# Patient Record
Sex: FEMALE | Race: WHITE | Employment: FULL TIME | URBAN - METROPOLITAN AREA
[De-identification: names, ages, dates, MRNs, and addresses within clinical notes are randomized per-mention and may not be internally consistent; named-entity substitution may affect disease eponyms.]

---

## 2018-09-18 ENCOUNTER — APPOINTMENT (OUTPATIENT)
Dept: GENERAL RADIOLOGY | Age: 53
End: 2018-09-18
Payer: COMMERCIAL

## 2018-09-18 ENCOUNTER — HOSPITAL ENCOUNTER (EMERGENCY)
Age: 53
Discharge: HOME | End: 2018-09-18
Payer: COMMERCIAL

## 2018-09-18 VITALS
OXYGEN SATURATION: 98 % | BODY MASS INDEX: 40.48 KG/M2 | SYSTOLIC BLOOD PRESSURE: 122 MMHG | TEMPERATURE: 98.5 F | RESPIRATION RATE: 16 BRPM | WEIGHT: 220 LBS | HEART RATE: 69 BPM | HEIGHT: 62 IN | DIASTOLIC BLOOD PRESSURE: 72 MMHG

## 2018-09-18 DIAGNOSIS — S53.401A SPRAIN OF RIGHT ELBOW, INITIAL ENCOUNTER: Primary | ICD-10-CM

## 2018-09-18 DIAGNOSIS — S43.401A SPRAIN OF RIGHT SHOULDER, UNSPECIFIED SHOULDER SPRAIN TYPE, INITIAL ENCOUNTER: ICD-10-CM

## 2018-09-18 PROCEDURE — 73030 X-RAY EXAM OF SHOULDER: CPT

## 2018-09-18 PROCEDURE — 6370000000 HC RX 637 (ALT 250 FOR IP): Performed by: NURSE PRACTITIONER

## 2018-09-18 PROCEDURE — 99283 EMERGENCY DEPT VISIT LOW MDM: CPT

## 2018-09-18 PROCEDURE — 73080 X-RAY EXAM OF ELBOW: CPT

## 2018-09-18 RX ORDER — IBUPROFEN 800 MG/1
800 TABLET ORAL ONCE
Status: COMPLETED | OUTPATIENT
Start: 2018-09-18 | End: 2018-09-18

## 2018-09-18 RX ORDER — ESTRADIOL 0.5 MG/1
0.5 TABLET ORAL DAILY
COMMUNITY
End: 2020-03-09

## 2018-09-18 RX ADMIN — IBUPROFEN 800 MG: 800 TABLET ORAL at 16:12

## 2018-09-18 ASSESSMENT — PAIN DESCRIPTION - PAIN TYPE
TYPE: ACUTE PAIN
TYPE: ACUTE PAIN

## 2018-09-18 ASSESSMENT — PAIN DESCRIPTION - FREQUENCY: FREQUENCY: CONTINUOUS

## 2018-09-18 ASSESSMENT — PAIN SCALES - GENERAL
PAINLEVEL_OUTOF10: 4
PAINLEVEL_OUTOF10: 5
PAINLEVEL_OUTOF10: 6
PAINLEVEL_OUTOF10: 6

## 2018-09-18 ASSESSMENT — PAIN DESCRIPTION - DESCRIPTORS
DESCRIPTORS: ACHING
DESCRIPTORS: ACHING

## 2018-09-18 ASSESSMENT — PAIN DESCRIPTION - ORIENTATION
ORIENTATION: RIGHT
ORIENTATION: RIGHT

## 2018-09-18 ASSESSMENT — PAIN DESCRIPTION - LOCATION
LOCATION: ELBOW
LOCATION: ELBOW

## 2018-09-18 NOTE — ED PROVIDER NOTES
[facility]       Department of Emergency Medicine   ED  Provider Note  Admit Date/RoomTime: 2018  3:37 PM  ED Room:   MRN: [id number]  Chief Complaint: Elbow Pain (right elbow pain hit it  on the wall)       History of Present Illness   Source of history provided by:  patient. History/Exam Limitations: none. Deepak Sheffield is a 48 y.o. female who has a past medical history of: History reviewed. No pertinent past medical history. presents to the ED by private car and is alone for right shoulder and elbow pain, beginning a few days ago and are gradually worsening since that time. The complaint has been persistent, moderate in severity. Patient states that she was carrying a wet-dry vac and the lid was not latched properly and when it almost spilled she jerked her shoulder and arm up hitting her right elbow on the wall. She states the symptoms are gradually worsening since the incident. She states the pain is achy in nature. No numbness or tingling. Minimal relief from 400 mg of ibuprofen at home. ROS    Pertinent positives and negatives are stated within HPI, all other systems reviewed and are negative. Past Surgical History:   Procedure Laterality Date     SECTION      HYSTERECTOMY     Social History:  reports that she has never smoked. She has never used smokeless tobacco. She reports that she does not drink alcohol or use drugs. Family History: family history is not on file. Allergies: Penicillins    Physical Exam   Oxygen Saturation Interpretation: Normal.   ED Triage Vitals   BP Temp Temp Source Pulse Resp SpO2 Height Weight   18 1544 18 1544 18 1544 18 1544 18 1544 18 1544 18 1541 18 1541   (!) 152/92 98.5 °F (36.9 °C) Oral 69 16 98 % 5' 2\" (1.575 m) 220 lb (99.8 kg)       Physical Exam  · Constitutional/General: Alert and oriented x3, well appearing, non toxic  · HEENT:  NC/NT. PERRLA,  Airway patent.   · Neck: Supple, full ROM, non tender to palpation in the midline, no stridor, no crepitus, no meningeal signs  · Respiratory: Lungs clear to auscultation bilaterally, no wheezes, rales, or rhonchi. Not in respiratory distress  · CV:  Regular rate. Regular rhythm. No murmurs, gallops, or rubs. 2+ distal pulses  · Chest: No chest wall tenderness  · GI:  Abdomen Soft, Non tender, Non distended. +BS. No rebound, guarding, or rigidity. No pulsatile masses. · Musculoskeletal: Moves all extremities x 4. Warm and well perfused, no clubbing, cyanosis, or edema. Capillary refill <3 seconds. Full range of motion with pain to right shoulder and elbow. No crepitus. No deformities. Tenderness to the lateral aspect of the antecubital and the right trapezius. MSPs intact. · Integument: skin warm and dry. No rashes. · Lymphatic: no lymphadenopathy noted  · Neurologic: GCS 15, no focal deficits, symmetric strength 5/5 in the upper and lower extremities bilaterally  · Psychiatric: Normal Affect    Lab / Imaging Results   (All laboratory and radiology results have been personally reviewed by myself)  Labs:  No results found for this visit on 09/18/18. Imaging: All Radiology results interpreted by Radiologist unless otherwise noted. XR ELBOW RIGHT (MIN 3 VIEWS)   Final Result   No acute skeletal traumatic findings. XR SHOULDER RIGHT (MIN 2 VIEWS)   Final Result   No evidence of fracture or dislocation of the shoulder. ED Course / Medical Decision Making     Medications   ibuprofen (ADVIL;MOTRIN) tablet 800 mg (800 mg Oral Given 9/18/18 1612)     ED Course as of Sep 18 2054   Tue Sep 18, 2018   1728 Awake and alert. Discussed results and plan. Symptoms improved. Exam unchanged. Answered questions.    [JG]      ED Course User Index  [JG] YAIMA Bolaños - CNP     Consult(s):   None    Procedure(s):   none    MDM:   Patient presents with a history of injury to right elbow and shoulder a few days ago with full range of

## 2020-11-28 ENCOUNTER — HOSPITAL ENCOUNTER (EMERGENCY)
Age: 55
Discharge: HOME | End: 2020-11-29
Attending: EMERGENCY MEDICINE
Payer: COMMERCIAL

## 2020-11-28 ENCOUNTER — APPOINTMENT (OUTPATIENT)
Dept: GENERAL RADIOLOGY | Age: 55
End: 2020-11-28
Payer: COMMERCIAL

## 2020-11-28 VITALS
BODY MASS INDEX: 39.51 KG/M2 | OXYGEN SATURATION: 97 % | RESPIRATION RATE: 16 BRPM | HEIGHT: 63 IN | HEART RATE: 83 BPM | TEMPERATURE: 96.6 F | DIASTOLIC BLOOD PRESSURE: 79 MMHG | SYSTOLIC BLOOD PRESSURE: 166 MMHG | WEIGHT: 223 LBS

## 2020-11-28 PROCEDURE — 96365 THER/PROPH/DIAG IV INF INIT: CPT

## 2020-11-28 PROCEDURE — 99283 EMERGENCY DEPT VISIT LOW MDM: CPT

## 2020-11-28 PROCEDURE — 90715 TDAP VACCINE 7 YRS/> IM: CPT | Performed by: EMERGENCY MEDICINE

## 2020-11-28 PROCEDURE — 73140 X-RAY EXAM OF FINGER(S): CPT

## 2020-11-28 PROCEDURE — 6360000002 HC RX W HCPCS: Performed by: EMERGENCY MEDICINE

## 2020-11-28 PROCEDURE — 90471 IMMUNIZATION ADMIN: CPT | Performed by: EMERGENCY MEDICINE

## 2020-11-28 RX ORDER — CLINDAMYCIN PHOSPHATE 600 MG/50ML
600 INJECTION INTRAVENOUS ONCE
Status: COMPLETED | OUTPATIENT
Start: 2020-11-28 | End: 2020-11-29

## 2020-11-28 RX ADMIN — TETANUS TOXOID, REDUCED DIPHTHERIA TOXOID AND ACELLULAR PERTUSSIS VACCINE, ADSORBED 0.5 ML: 5; 2.5; 8; 8; 2.5 SUSPENSION INTRAMUSCULAR at 23:50

## 2020-11-28 ASSESSMENT — PAIN SCALES - GENERAL: PAINLEVEL_OUTOF10: 5

## 2020-11-28 ASSESSMENT — PAIN DESCRIPTION - ORIENTATION: ORIENTATION: RIGHT

## 2020-11-28 ASSESSMENT — PAIN DESCRIPTION - LOCATION: LOCATION: FINGER (COMMENT WHICH ONE)

## 2020-11-28 ASSESSMENT — PAIN DESCRIPTION - PAIN TYPE: TYPE: ACUTE PAIN

## 2020-11-28 ASSESSMENT — PAIN DESCRIPTION - DESCRIPTORS: DESCRIPTORS: SQUEEZING;DULL

## 2020-11-29 PROCEDURE — 2500000003 HC RX 250 WO HCPCS: Performed by: EMERGENCY MEDICINE

## 2020-11-29 RX ORDER — CLINDAMYCIN HYDROCHLORIDE 150 MG/1
450 CAPSULE ORAL 3 TIMES DAILY
Qty: 90 CAPSULE | Refills: 0 | Status: SHIPPED | OUTPATIENT
Start: 2020-11-29 | End: 2020-12-09

## 2020-11-29 RX ADMIN — CLINDAMYCIN PHOSPHATE 600 MG: 600 INJECTION, SOLUTION INTRAVENOUS at 00:00

## 2020-11-29 NOTE — ED NOTES
Right thumb red and tender-  Tetanus status \"A long time ago\" + radial pulse   Cap refill < 2 seconds     Victoria Baker RN  11/28/20 5103

## 2020-11-29 NOTE — ED PROVIDER NOTES
HPI:  20, Time: 11:31 PM SOPHY Escoto is a 50 y.o. female presenting to the ED for puncture wound to right thumb, beginning 2 days ago after sticking her hand in puppy's mouth to remove a squirrel. Unsure if puncutred by dog tooth or squirrel claw. Now has a red ring around the puncture wound. Concerned for retained foreign body as well. Needs tetanus update too. She is right-handed. The complaint has been persistent, moderate in severity, and worsened by nothing. Patient denies paresthesias or difficulty or pain with movement of the right thumb or hand or other complaints bennie this time. She is allergic to PCN. ROS:   A complete review of systems was performed and all pertinent positives and negatives are stated within HPI, all other systems reviewed and are negative.      --------------------------------------------- PAST HISTORY ---------------------------------------------  Past Medical History:  has no past medical history on file. Past Surgical History:  has a past surgical history that includes Hysterectomy and  section. Social History:  reports that she has never smoked. She has never used smokeless tobacco. She reports that she does not drink alcohol or use drugs. Family History: family history is not on file. The patients home medications have been reviewed. Allergies: Penicillins        ----------------------------------------PHYSICAL EXAM--------------------------------------  Constitutional:  Well developed, well nourished, no acute distress, non-toxic appearance   Eyes:  PERRL, conjunctiva normal, EOMI  HENT:  Atraumatic, external ears normal, nose normal, oropharynx moist. Neck- normal range of motion, no nuchal rigidity   Respiratory:  No respiratory distress  Cardiovascular:  Normal rate, normal rhythm. Radial pulses 2+ bilaterally. Musculoskeletal:  No edema, no deformities.  Patient has full range of motion of right hand fingers and thumb disposition, IV medications and a personal history and physicial eaxmination    This patient has remained hemodynamically stable and remained unchanged during their ED course. Re-Evaluations:  Time: 12:17 AM EST   Re-evaluation. Patients symptoms show no change  Repeat physical examination is not changed      Consultations:   none    Critical Care: none    Genny STOKES DO, am the Primary Provider of Record    Counseling: The emergency provider has spoken with the patient and discussed todays results, in addition to providing specific details for the plan of care and counseling regarding the diagnosis and prognosis. Questions are answered at this time and they are agreeable with the plan.    --------------------------- IMPRESSION AND DISPOSITION ---------------------------------    IMPRESSION  1. Bite by animal    2.  Elevated blood pressure reading        DISPOSITION  Disposition: Discharge to home  Patient condition is stable             Nu Yousif DO  11/29/20 0017

## 2022-05-28 ENCOUNTER — APPOINTMENT (OUTPATIENT)
Dept: CT IMAGING | Age: 57
End: 2022-05-28
Payer: COMMERCIAL

## 2022-05-28 ENCOUNTER — HOSPITAL ENCOUNTER (INPATIENT)
Age: 57
LOS: 1 days | Discharge: HOME | DRG: 445 | End: 2022-05-29
Attending: STUDENT IN AN ORGANIZED HEALTH CARE EDUCATION/TRAINING PROGRAM | Admitting: STUDENT IN AN ORGANIZED HEALTH CARE EDUCATION/TRAINING PROGRAM
Payer: COMMERCIAL

## 2022-05-28 ENCOUNTER — APPOINTMENT (OUTPATIENT)
Dept: ULTRASOUND IMAGING | Age: 57
DRG: 445 | End: 2022-05-28
Attending: STUDENT IN AN ORGANIZED HEALTH CARE EDUCATION/TRAINING PROGRAM
Payer: COMMERCIAL

## 2022-05-28 ENCOUNTER — HOSPITAL ENCOUNTER (EMERGENCY)
Age: 57
Discharge: ANOTHER HOSPITAL | End: 2022-05-28
Attending: EMERGENCY MEDICINE
Payer: COMMERCIAL

## 2022-05-28 VITALS
DIASTOLIC BLOOD PRESSURE: 88 MMHG | OXYGEN SATURATION: 98 % | HEART RATE: 92 BPM | HEIGHT: 63 IN | SYSTOLIC BLOOD PRESSURE: 170 MMHG | RESPIRATION RATE: 14 BRPM | BODY MASS INDEX: 39.87 KG/M2 | TEMPERATURE: 98.6 F | WEIGHT: 225 LBS

## 2022-05-28 DIAGNOSIS — R11.2 INTRACTABLE NAUSEA AND VOMITING: Primary | ICD-10-CM

## 2022-05-28 DIAGNOSIS — R10.11 ABDOMINAL PAIN, RIGHT UPPER QUADRANT: ICD-10-CM

## 2022-05-28 PROBLEM — R52 INTRACTABLE PAIN: Status: ACTIVE | Noted: 2022-05-28

## 2022-05-28 LAB
ALBUMIN SERPL-MCNC: 4.3 G/DL (ref 3.5–5.2)
ALBUMIN SERPL-MCNC: 4.5 G/DL (ref 3.5–5.2)
ALP BLD-CCNC: 100 U/L (ref 35–104)
ALP BLD-CCNC: 93 U/L (ref 35–104)
ALT SERPL-CCNC: 23 U/L (ref 0–32)
ALT SERPL-CCNC: 26 U/L (ref 0–32)
ANION GAP SERPL CALCULATED.3IONS-SCNC: 12 MMOL/L (ref 7–16)
ANION GAP SERPL CALCULATED.3IONS-SCNC: 14 MMOL/L (ref 7–16)
AST SERPL-CCNC: 18 U/L (ref 0–31)
AST SERPL-CCNC: 20 U/L (ref 0–31)
BACTERIA: ABNORMAL /HPF
BASOPHILS ABSOLUTE: 0.03 E9/L (ref 0–0.2)
BASOPHILS ABSOLUTE: 0.04 E9/L (ref 0–0.2)
BASOPHILS RELATIVE PERCENT: 0.4 % (ref 0–2)
BASOPHILS RELATIVE PERCENT: 0.5 % (ref 0–2)
BILIRUB SERPL-MCNC: 0.3 MG/DL (ref 0–1.2)
BILIRUB SERPL-MCNC: 0.5 MG/DL (ref 0–1.2)
BILIRUBIN URINE: NEGATIVE
BLOOD, URINE: ABNORMAL
BUN BLDV-MCNC: 12 MG/DL (ref 6–20)
BUN BLDV-MCNC: 19 MG/DL (ref 6–20)
C-REACTIVE PROTEIN: 0.8 MG/DL (ref 0–0.4)
CALCIUM SERPL-MCNC: 9.1 MG/DL (ref 8.6–10.2)
CALCIUM SERPL-MCNC: 9.8 MG/DL (ref 8.6–10.2)
CHLORIDE BLD-SCNC: 105 MMOL/L (ref 98–107)
CHLORIDE BLD-SCNC: 105 MMOL/L (ref 98–107)
CHOLESTEROL, TOTAL: 173 MG/DL (ref 0–199)
CLARITY: CLEAR
CO2: 22 MMOL/L (ref 22–29)
CO2: 25 MMOL/L (ref 22–29)
COLOR: YELLOW
CREAT SERPL-MCNC: 0.6 MG/DL (ref 0.5–1)
CREAT SERPL-MCNC: 0.8 MG/DL (ref 0.5–1)
EOSINOPHILS ABSOLUTE: 0.06 E9/L (ref 0.05–0.5)
EOSINOPHILS ABSOLUTE: 0.34 E9/L (ref 0.05–0.5)
EOSINOPHILS RELATIVE PERCENT: 0.7 % (ref 0–6)
EOSINOPHILS RELATIVE PERCENT: 4.3 % (ref 0–6)
GFR AFRICAN AMERICAN: >60
GFR AFRICAN AMERICAN: >60
GFR NON-AFRICAN AMERICAN: >60 ML/MIN/1.73
GFR NON-AFRICAN AMERICAN: >60 ML/MIN/1.73
GLUCOSE BLD-MCNC: 131 MG/DL (ref 74–99)
GLUCOSE BLD-MCNC: 92 MG/DL (ref 74–99)
GLUCOSE URINE: NEGATIVE MG/DL
HBA1C MFR BLD: 5.8 % (ref 4–5.6)
HCT VFR BLD CALC: 40.7 % (ref 34–48)
HCT VFR BLD CALC: 41.2 % (ref 34–48)
HDLC SERPL-MCNC: 42 MG/DL
HEMOGLOBIN: 12.8 G/DL (ref 11.5–15.5)
HEMOGLOBIN: 13.6 G/DL (ref 11.5–15.5)
IMMATURE GRANULOCYTES #: 0.02 E9/L
IMMATURE GRANULOCYTES #: 0.03 E9/L
IMMATURE GRANULOCYTES %: 0.3 % (ref 0–5)
IMMATURE GRANULOCYTES %: 0.4 % (ref 0–5)
KETONES, URINE: NEGATIVE MG/DL
LACTIC ACID: 1.4 MMOL/L (ref 0.5–2.2)
LDL CHOLESTEROL CALCULATED: 96 MG/DL (ref 0–99)
LEUKOCYTE ESTERASE, URINE: ABNORMAL
LIPASE: 29 U/L (ref 13–60)
LYMPHOCYTES ABSOLUTE: 1.34 E9/L (ref 1.5–4)
LYMPHOCYTES ABSOLUTE: 2.61 E9/L (ref 1.5–4)
LYMPHOCYTES RELATIVE PERCENT: 15.7 % (ref 20–42)
LYMPHOCYTES RELATIVE PERCENT: 32.8 % (ref 20–42)
MCH RBC QN AUTO: 26.9 PG (ref 26–35)
MCH RBC QN AUTO: 27.5 PG (ref 26–35)
MCHC RBC AUTO-ENTMCNC: 31.4 % (ref 32–34.5)
MCHC RBC AUTO-ENTMCNC: 33 % (ref 32–34.5)
MCV RBC AUTO: 83.2 FL (ref 80–99.9)
MCV RBC AUTO: 85.7 FL (ref 80–99.9)
MONOCYTES ABSOLUTE: 0.55 E9/L (ref 0.1–0.95)
MONOCYTES ABSOLUTE: 0.57 E9/L (ref 0.1–0.95)
MONOCYTES RELATIVE PERCENT: 6.5 % (ref 2–12)
MONOCYTES RELATIVE PERCENT: 7.2 % (ref 2–12)
NEUTROPHILS ABSOLUTE: 4.37 E9/L (ref 1.8–7.3)
NEUTROPHILS ABSOLUTE: 6.51 E9/L (ref 1.8–7.3)
NEUTROPHILS RELATIVE PERCENT: 54.9 % (ref 43–80)
NEUTROPHILS RELATIVE PERCENT: 76.3 % (ref 43–80)
NITRITE, URINE: NEGATIVE
PDW BLD-RTO: 14.5 FL (ref 11.5–15)
PDW BLD-RTO: 14.5 FL (ref 11.5–15)
PH UA: 6 (ref 5–9)
PLATELET # BLD: 312 E9/L (ref 130–450)
PLATELET # BLD: 356 E9/L (ref 130–450)
PMV BLD AUTO: 10.6 FL (ref 7–12)
PMV BLD AUTO: 10.8 FL (ref 7–12)
POTASSIUM REFLEX MAGNESIUM: 4 MMOL/L (ref 3.5–5)
POTASSIUM SERPL-SCNC: 3.8 MMOL/L (ref 3.5–5)
PROTEIN UA: NEGATIVE MG/DL
RBC # BLD: 4.75 E12/L (ref 3.5–5.5)
RBC # BLD: 4.95 E12/L (ref 3.5–5.5)
RBC UA: ABNORMAL /HPF (ref 0–2)
SEDIMENTATION RATE, ERYTHROCYTE: 7 MM/HR (ref 0–20)
SODIUM BLD-SCNC: 141 MMOL/L (ref 132–146)
SODIUM BLD-SCNC: 142 MMOL/L (ref 132–146)
SPECIFIC GRAVITY UA: 1.02 (ref 1–1.03)
TOTAL PROTEIN: 7.4 G/DL (ref 6.4–8.3)
TOTAL PROTEIN: 7.6 G/DL (ref 6.4–8.3)
TRIGL SERPL-MCNC: 175 MG/DL (ref 0–149)
TROPONIN, HIGH SENSITIVITY: <6 NG/L (ref 0–9)
UROBILINOGEN, URINE: 0.2 E.U./DL
VLDLC SERPL CALC-MCNC: 35 MG/DL
WBC # BLD: 8 E9/L (ref 4.5–11.5)
WBC # BLD: 8.5 E9/L (ref 4.5–11.5)
WBC UA: ABNORMAL /HPF (ref 0–5)

## 2022-05-28 PROCEDURE — 85025 COMPLETE CBC W/AUTO DIFF WBC: CPT

## 2022-05-28 PROCEDURE — 6370000000 HC RX 637 (ALT 250 FOR IP): Performed by: STUDENT IN AN ORGANIZED HEALTH CARE EDUCATION/TRAINING PROGRAM

## 2022-05-28 PROCEDURE — G0378 HOSPITAL OBSERVATION PER HR: HCPCS

## 2022-05-28 PROCEDURE — 6360000002 HC RX W HCPCS: Performed by: STUDENT IN AN ORGANIZED HEALTH CARE EDUCATION/TRAINING PROGRAM

## 2022-05-28 PROCEDURE — 6360000004 HC RX CONTRAST MEDICATION: Performed by: RADIOLOGY

## 2022-05-28 PROCEDURE — 83605 ASSAY OF LACTIC ACID: CPT

## 2022-05-28 PROCEDURE — 1200000000 HC SEMI PRIVATE

## 2022-05-28 PROCEDURE — 81001 URINALYSIS AUTO W/SCOPE: CPT

## 2022-05-28 PROCEDURE — 93005 ELECTROCARDIOGRAM TRACING: CPT

## 2022-05-28 PROCEDURE — 74177 CT ABD & PELVIS W/CONTRAST: CPT

## 2022-05-28 PROCEDURE — 96372 THER/PROPH/DIAG INJ SC/IM: CPT

## 2022-05-28 PROCEDURE — 80053 COMPREHEN METABOLIC PANEL: CPT

## 2022-05-28 PROCEDURE — 6360000002 HC RX W HCPCS

## 2022-05-28 PROCEDURE — 85651 RBC SED RATE NONAUTOMATED: CPT

## 2022-05-28 PROCEDURE — 83036 HEMOGLOBIN GLYCOSYLATED A1C: CPT

## 2022-05-28 PROCEDURE — 84484 ASSAY OF TROPONIN QUANT: CPT

## 2022-05-28 PROCEDURE — 36415 COLL VENOUS BLD VENIPUNCTURE: CPT

## 2022-05-28 PROCEDURE — 96376 TX/PRO/DX INJ SAME DRUG ADON: CPT

## 2022-05-28 PROCEDURE — 83690 ASSAY OF LIPASE: CPT

## 2022-05-28 PROCEDURE — 6360000002 HC RX W HCPCS: Performed by: EMERGENCY MEDICINE

## 2022-05-28 PROCEDURE — 2580000003 HC RX 258: Performed by: STUDENT IN AN ORGANIZED HEALTH CARE EDUCATION/TRAINING PROGRAM

## 2022-05-28 PROCEDURE — 99285 EMERGENCY DEPT VISIT HI MDM: CPT

## 2022-05-28 PROCEDURE — 96375 TX/PRO/DX INJ NEW DRUG ADDON: CPT

## 2022-05-28 PROCEDURE — 76705 ECHO EXAM OF ABDOMEN: CPT

## 2022-05-28 PROCEDURE — 96374 THER/PROPH/DIAG INJ IV PUSH: CPT

## 2022-05-28 PROCEDURE — 86140 C-REACTIVE PROTEIN: CPT

## 2022-05-28 PROCEDURE — 2580000003 HC RX 258: Performed by: EMERGENCY MEDICINE

## 2022-05-28 PROCEDURE — 80061 LIPID PANEL: CPT

## 2022-05-28 PROCEDURE — G0379 DIRECT REFER HOSPITAL OBSERV: HCPCS

## 2022-05-28 RX ORDER — SODIUM CHLORIDE 0.9 % (FLUSH) 0.9 %
10 SYRINGE (ML) INJECTION PRN
Status: DISCONTINUED | OUTPATIENT
Start: 2022-05-28 | End: 2022-05-29 | Stop reason: HOSPADM

## 2022-05-28 RX ORDER — ACETAMINOPHEN 650 MG/1
650 SUPPOSITORY RECTAL EVERY 6 HOURS PRN
Status: DISCONTINUED | OUTPATIENT
Start: 2022-05-28 | End: 2022-05-29 | Stop reason: HOSPADM

## 2022-05-28 RX ORDER — METOCLOPRAMIDE HYDROCHLORIDE 5 MG/ML
5 INJECTION INTRAMUSCULAR; INTRAVENOUS ONCE
Status: COMPLETED | OUTPATIENT
Start: 2022-05-28 | End: 2022-05-28

## 2022-05-28 RX ORDER — LANOLIN ALCOHOL/MO/W.PET/CERES
3 CREAM (GRAM) TOPICAL NIGHTLY PRN
Status: DISCONTINUED | OUTPATIENT
Start: 2022-05-28 | End: 2022-05-29 | Stop reason: HOSPADM

## 2022-05-28 RX ORDER — LORAZEPAM 2 MG/ML
0.5 INJECTION INTRAMUSCULAR ONCE
Status: COMPLETED | OUTPATIENT
Start: 2022-05-28 | End: 2022-05-28

## 2022-05-28 RX ORDER — LORAZEPAM 2 MG/ML
1 INJECTION INTRAMUSCULAR ONCE
Status: COMPLETED | OUTPATIENT
Start: 2022-05-28 | End: 2022-05-28

## 2022-05-28 RX ORDER — DIPHENHYDRAMINE HYDROCHLORIDE 50 MG/ML
50 INJECTION INTRAMUSCULAR; INTRAVENOUS ONCE
Status: COMPLETED | OUTPATIENT
Start: 2022-05-28 | End: 2022-05-28

## 2022-05-28 RX ORDER — SODIUM CHLORIDE 9 MG/ML
INJECTION, SOLUTION INTRAVENOUS CONTINUOUS
Status: DISCONTINUED | OUTPATIENT
Start: 2022-05-28 | End: 2022-05-29 | Stop reason: HOSPADM

## 2022-05-28 RX ORDER — ONDANSETRON 2 MG/ML
4 INJECTION INTRAMUSCULAR; INTRAVENOUS ONCE
Status: COMPLETED | OUTPATIENT
Start: 2022-05-28 | End: 2022-05-28

## 2022-05-28 RX ORDER — PROMETHAZINE HYDROCHLORIDE 25 MG/ML
6.25 INJECTION, SOLUTION INTRAMUSCULAR; INTRAVENOUS EVERY 6 HOURS PRN
Status: DISCONTINUED | OUTPATIENT
Start: 2022-05-28 | End: 2022-05-29 | Stop reason: HOSPADM

## 2022-05-28 RX ORDER — PROCHLORPERAZINE EDISYLATE 5 MG/ML
INJECTION INTRAMUSCULAR; INTRAVENOUS
Status: COMPLETED
Start: 2022-05-28 | End: 2022-05-28

## 2022-05-28 RX ORDER — ONDANSETRON 2 MG/ML
4 INJECTION INTRAMUSCULAR; INTRAVENOUS EVERY 6 HOURS PRN
Status: DISCONTINUED | OUTPATIENT
Start: 2022-05-28 | End: 2022-05-29 | Stop reason: HOSPADM

## 2022-05-28 RX ORDER — MORPHINE SULFATE 2 MG/ML
2 INJECTION, SOLUTION INTRAMUSCULAR; INTRAVENOUS EVERY 4 HOURS PRN
Status: DISCONTINUED | OUTPATIENT
Start: 2022-05-28 | End: 2022-05-29 | Stop reason: HOSPADM

## 2022-05-28 RX ORDER — SODIUM CHLORIDE 0.9 % (FLUSH) 0.9 %
10 SYRINGE (ML) INJECTION EVERY 12 HOURS SCHEDULED
Status: DISCONTINUED | OUTPATIENT
Start: 2022-05-28 | End: 2022-05-29 | Stop reason: HOSPADM

## 2022-05-28 RX ORDER — PROCHLORPERAZINE EDISYLATE 5 MG/ML
5 INJECTION INTRAMUSCULAR; INTRAVENOUS ONCE
Status: COMPLETED | OUTPATIENT
Start: 2022-05-28 | End: 2022-05-28

## 2022-05-28 RX ORDER — POTASSIUM CHLORIDE 7.45 MG/ML
10 INJECTION INTRAVENOUS PRN
Status: DISCONTINUED | OUTPATIENT
Start: 2022-05-28 | End: 2022-05-29 | Stop reason: HOSPADM

## 2022-05-28 RX ORDER — KETOROLAC TROMETHAMINE 30 MG/ML
30 INJECTION, SOLUTION INTRAMUSCULAR; INTRAVENOUS ONCE
Status: COMPLETED | OUTPATIENT
Start: 2022-05-28 | End: 2022-05-28

## 2022-05-28 RX ORDER — SODIUM CHLORIDE 9 MG/ML
INJECTION, SOLUTION INTRAVENOUS PRN
Status: DISCONTINUED | OUTPATIENT
Start: 2022-05-28 | End: 2022-05-29 | Stop reason: HOSPADM

## 2022-05-28 RX ORDER — ACETAMINOPHEN 325 MG/1
650 TABLET ORAL EVERY 6 HOURS PRN
Status: DISCONTINUED | OUTPATIENT
Start: 2022-05-28 | End: 2022-05-29 | Stop reason: HOSPADM

## 2022-05-28 RX ORDER — POTASSIUM CHLORIDE 20 MEQ/1
40 TABLET, EXTENDED RELEASE ORAL PRN
Status: DISCONTINUED | OUTPATIENT
Start: 2022-05-28 | End: 2022-05-29 | Stop reason: HOSPADM

## 2022-05-28 RX ORDER — ONDANSETRON 4 MG/1
4 TABLET, ORALLY DISINTEGRATING ORAL EVERY 8 HOURS PRN
Status: DISCONTINUED | OUTPATIENT
Start: 2022-05-28 | End: 2022-05-29 | Stop reason: HOSPADM

## 2022-05-28 RX ORDER — 0.9 % SODIUM CHLORIDE 0.9 %
1000 INTRAVENOUS SOLUTION INTRAVENOUS ONCE
Status: COMPLETED | OUTPATIENT
Start: 2022-05-28 | End: 2022-05-28

## 2022-05-28 RX ORDER — SENNA PLUS 8.6 MG/1
1 TABLET ORAL DAILY PRN
Status: DISCONTINUED | OUTPATIENT
Start: 2022-05-28 | End: 2022-05-29 | Stop reason: HOSPADM

## 2022-05-28 RX ORDER — ENOXAPARIN SODIUM 100 MG/ML
30 INJECTION SUBCUTANEOUS 2 TIMES DAILY
Status: DISCONTINUED | OUTPATIENT
Start: 2022-05-28 | End: 2022-05-29 | Stop reason: HOSPADM

## 2022-05-28 RX ORDER — PROCHLORPERAZINE EDISYLATE 5 MG/ML
10 INJECTION INTRAMUSCULAR; INTRAVENOUS EVERY 6 HOURS PRN
Status: DISCONTINUED | OUTPATIENT
Start: 2022-05-28 | End: 2022-05-29 | Stop reason: HOSPADM

## 2022-05-28 RX ADMIN — METOCLOPRAMIDE HYDROCHLORIDE 5 MG: 5 INJECTION INTRAMUSCULAR; INTRAVENOUS at 02:20

## 2022-05-28 RX ADMIN — METOCLOPRAMIDE HYDROCHLORIDE 5 MG: 5 INJECTION INTRAMUSCULAR; INTRAVENOUS at 04:47

## 2022-05-28 RX ADMIN — ENOXAPARIN SODIUM 30 MG: 100 INJECTION SUBCUTANEOUS at 12:26

## 2022-05-28 RX ADMIN — PROCHLORPERAZINE EDISYLATE 5 MG: 5 INJECTION INTRAMUSCULAR; INTRAVENOUS at 03:08

## 2022-05-28 RX ADMIN — SODIUM CHLORIDE: 9 INJECTION, SOLUTION INTRAVENOUS at 11:13

## 2022-05-28 RX ADMIN — LORAZEPAM 0.5 MG: 2 INJECTION INTRAMUSCULAR; INTRAVENOUS at 04:01

## 2022-05-28 RX ADMIN — SODIUM CHLORIDE: 9 INJECTION, SOLUTION INTRAVENOUS at 20:56

## 2022-05-28 RX ADMIN — DIPHENHYDRAMINE HYDROCHLORIDE 50 MG: 50 INJECTION INTRAMUSCULAR; INTRAVENOUS at 02:20

## 2022-05-28 RX ADMIN — LORAZEPAM 1 MG: 2 INJECTION INTRAMUSCULAR; INTRAVENOUS at 02:20

## 2022-05-28 RX ADMIN — ACETAMINOPHEN 650 MG: 325 TABLET ORAL at 21:00

## 2022-05-28 RX ADMIN — Medication 10 ML: at 21:01

## 2022-05-28 RX ADMIN — ONDANSETRON 4 MG: 2 INJECTION INTRAMUSCULAR; INTRAVENOUS at 01:05

## 2022-05-28 RX ADMIN — SODIUM CHLORIDE 1000 ML: 9 INJECTION, SOLUTION INTRAVENOUS at 01:05

## 2022-05-28 RX ADMIN — KETOROLAC TROMETHAMINE 30 MG: 30 INJECTION, SOLUTION INTRAMUSCULAR at 01:06

## 2022-05-28 RX ADMIN — Medication 10 ML: at 12:18

## 2022-05-28 RX ADMIN — ONDANSETRON 4 MG: 2 INJECTION INTRAMUSCULAR; INTRAVENOUS at 02:58

## 2022-05-28 RX ADMIN — IOPAMIDOL 75 ML: 755 INJECTION, SOLUTION INTRAVENOUS at 02:50

## 2022-05-28 RX ADMIN — ENOXAPARIN SODIUM 30 MG: 100 INJECTION SUBCUTANEOUS at 21:00

## 2022-05-28 ASSESSMENT — PAIN DESCRIPTION - FREQUENCY: FREQUENCY: CONTINUOUS

## 2022-05-28 ASSESSMENT — PAIN SCALES - GENERAL
PAINLEVEL_OUTOF10: 0
PAINLEVEL_OUTOF10: 9
PAINLEVEL_OUTOF10: 3
PAINLEVEL_OUTOF10: 3
PAINLEVEL_OUTOF10: 10
PAINLEVEL_OUTOF10: 10

## 2022-05-28 ASSESSMENT — PAIN DESCRIPTION - LOCATION
LOCATION: HEAD
LOCATION: ABDOMEN
LOCATION: FLANK

## 2022-05-28 ASSESSMENT — PAIN DESCRIPTION - DESCRIPTORS
DESCRIPTORS: SHARP
DESCRIPTORS: CRAMPING;JABBING
DESCRIPTORS: ACHING

## 2022-05-28 ASSESSMENT — PAIN DESCRIPTION - ORIENTATION
ORIENTATION: RIGHT
ORIENTATION: RIGHT

## 2022-05-28 ASSESSMENT — PAIN DESCRIPTION - PAIN TYPE
TYPE: ACUTE PAIN
TYPE: ACUTE PAIN

## 2022-05-28 ASSESSMENT — PAIN - FUNCTIONAL ASSESSMENT
PAIN_FUNCTIONAL_ASSESSMENT: 0-10
PAIN_FUNCTIONAL_ASSESSMENT: ACTIVITIES ARE NOT PREVENTED

## 2022-05-28 NOTE — CONSULTS
GENERAL SURGERY  CONSULT NOTE  2022    Physician Consulted: Dr. MAYORGA [facility]  Reason for Consult: abd pain  Referring Physician: Dr. Carlitos Gustfason is a 56y.o. year old female presented with a chief complaint of ruq pain     Patient presented to ED in [place] [address] for right upper quadrant pain, beginning 1 day ago.  The complaint has been persistent, severe in severity, and worsened by nothing.  Patient states she ate a Western Aisha dip roast beef sandwich prior to going to bed.  States she has not had any fever/chills associated, no chest heaviness/pressure/tightness, no coughing, no shortness of breath, no change in bowel/bladder patterns.  She rated her pain at 10/10 severity at [facility] denies any radiation of pain to the back or to the right lower quadrant.  No relieving factors are reported.  No other complaints. Upon evaluation this afternoon she states her abd pain has resolved. No past medical history on file. Past Surgical History:   Procedure Laterality Date     SECTION      HYSTERECTOMY         Medications Prior to Admission:    Prior to Admission medications    Medication Sig Start Date End Date Taking? Authorizing Provider   sertraline (ZOLOFT) 100 MG tablet Take 1 tablet by mouth daily 2/9/22 5/10/22  Serena Baumgarten, MD       Allergies   Allergen Reactions    Penicillins        No family history on file.     Social History     Tobacco Use    Smoking status: Never Smoker    Smokeless tobacco: Never Used   Substance Use Topics    Alcohol use: No    Drug use: No         General ROS: negative for - chills, fatigue or malaise  ENT ROS: negative for - hearing change, nasal congestion or nasal discharge  Allergy and Immunology ROS: negative for - hives, itchy/watery eyes or nasal congestion  Hematological and Lymphatic ROS: negative for - blood clots, blood transfusions, bruising or fatigue  Endocrine ROS: negative for - malaise/lethargy, mood swings, palpitations or polydipsia/polyuria  Respiratory ROS: negative for - sputum changes, stridor, tachypnea or wheezing  Cardiovascular ROS: negative for - irregular heartbeat, loss of consciousness, murmur or orthopnea  Gastrointestinal ROS: negative for - constipation, diarrhea, gas/bloating, heartburn or hematemesis  Genito-Urinary ROS: negative for -  genital discharge, genital ulcers or hematuria  Musculoskeletal ROS: negative for - gait disturbance, muscle pain or muscular weakness        PHYSICAL EXAM:    Vitals:    05/28/22 1700   BP: (!) 145/73   Pulse: 79   Resp: 18   Temp: 98.7 °F (37.1 °C)   SpO2: 94%       Gen: aox3, nad  Heent: nc/at  Neck: trachea midline, no LAD  Cvs: rrr  Lungs: non labored  Abd: soft, min ruq ttp, no r/g/r, non distended  Neuro: no focal deficits      LABS:    CBC  Recent Labs     05/28/22  1130   WBC 8.5   HGB 12.8   HCT 40.7        BMP  Recent Labs     05/28/22  1130      K 4.0      CO2 25   BUN 12   CREATININE 0.6   CALCIUM 9.1     Liver Function  Recent Labs     05/28/22  1130   LIPASE 29   BILITOT 0.5   AST 18   ALT 23   ALKPHOS 93   PROT 7.4   LABALBU 4.3     No results for input(s): LACTATE in the last 72 hours. No results for input(s): INR, PTT in the last 72 hours. Invalid input(s): PT    RADIOLOGY    CT ABDOMEN PELVIS W IV CONTRAST Additional Contrast? None    Result Date: 5/28/2022  EXAMINATION: CT OF THE ABDOMEN AND PELVIS WITH CONTRAST 5/28/2022 2:41 am TECHNIQUE: CT of the abdomen and pelvis was performed with the administration of intravenous contrast. Multiplanar reformatted images are provided for review. Automated exposure control, iterative reconstruction, and/or weight based adjustment of the mA/kV was utilized to reduce the radiation dose to as low as reasonably achievable. COMPARISON: None.  HISTORY: ORDERING SYSTEM PROVIDED HISTORY: R Flank and RLQ pain TECHNOLOGIST PROVIDED HISTORY: Additional Contrast?->None Reason for exam:->R Flank and RLQ pain Decision Support Exception - unselect if not a suspected or confirmed emergency medical condition->Emergency Medical Condition (MA) FINDINGS: Lower Chest: The lung bases are clear. There is no pleural or pericardial effusion. Organs: The liver, spleen, kidneys, adrenal glands and pancreas are normal. The gallbladder is at the upper limits of normal with respect to size. However, no calcified gallstones or pericholecystic fluid/inflammation is identified. GI/Bowel: The small and large bowel, including the appendix, are normal.  No bowel obstruction, free air or acute appendicitis. Pelvis: The pelvic organs are normal.  No free fluid or lymphadenopathy. Peritoneum/Retroperitoneum: No mass, fluid collection or lymphadenopathy is seen. Bones/Soft Tissues: No osseous, soft tissue or vascular abnormality is seen. No bowel obstruction, free air, obstructive uropathy or acute appendicitis. The gallbladder is borderline distended. No calcified gallstones or pericholecystic fluid/inflammation is identified to suggest secondary findings of acute cholecystitis. Correlation clinical findings is suggested, and if warranted clinically, further evaluation may be considered with a right upper quadrant ultrasound. US GALLBLADDER RUQ    Result Date: 5/28/2022  EXAMINATION: RIGHT UPPER QUADRANT ULTRASOUND 5/28/2022 10:16 am COMPARISON: None. HISTORY: ORDERING SYSTEM PROVIDED HISTORY: gb distention TECHNOLOGIST PROVIDED HISTORY: Reason for exam:->gb distention What reading provider will be dictating this exam?->CRC FINDINGS: LIVER:  The liver demonstrates increased echogenicity without evidence of intrahepatic biliary ductal dilatation. BILIARY SYSTEM:  Gallbladder reveals multiple stones. There is no wall thickening or biliary ductal dilatation. No pericholecystic fluid. Negative sonographic West sign. Common bile duct is within normal limits measuring 4 mm.  RIGHT KIDNEY: The right kidney is grossly unremarkable without evidence

## 2022-05-28 NOTE — PROGRESS NOTES
This patient is on medication that requires renal, weight, and/or indication dose adjustment. Date Body Weight IBW  Adjusted BW SCr  CrCl Dialysis status   5/28/2022 239 lb 8 oz (108.6 kg) Ideal body weight: 52.4 kg (115 lb 8.3 oz)  Adjusted ideal body weight: 74.9 kg (165 lb 1.8 oz) Serum creatinine: 0.8 mg/dL 05/28/22 0100  Estimated creatinine clearance: 94 mL/min N/a       Pharmacy has dose-adjusted the following medication(s):    Date Previous Order Adjusted Order   5/28/2022 Enoxaparin 40 mg SubQ Daily Enoxaparin 30 mg SubQ BID       These changes were made per protocol according to the [facility] Clinical Guidance for Pharmacists. *Please note this dose may need readjusted if patient's condition changes. Please contact pharmacy with any questions regarding these changes.     Ken Dasilva, [address] [facility]  5/28/2022  10:04 AM

## 2022-05-28 NOTE — H&P
Internal Medicine History & Physical     Name: Jc Hardwick  : 1965  Chief Complaint: No chief complaint on file. Primary Care Physician: Jil Ramirez MD  Admission date: 2022  Date of service: 2022     History of Present Illness  Alpa Gerard is a 56y.o. year old female presented with a chief complaint of ruq pain    Patient presented to ED in [place] [address] for right upper quadrant pain, beginning 1 day ago. The complaint has been persistent, severe in severity, and worsened by nothing. Patient states she ate a Western Aisha dip roast beef sandwich prior to going to bed. States she has not had any fever/chills associated, no chest heaviness/pressure/tightness, no coughing, no shortness of breath, no change in bowel/bladder patterns. She rates her pain at 10/10 severity. She denies any radiation of pain to the back or to the right lower quadrant. No relieving factors are reported. No other complaints. Today at [facility], she was seen an examined and found to be completely pain free. We discussed the findings of gall stones and plan for HIDA scan.  at bedside         No past medical history on file. Past Surgical History:   Procedure Laterality Date     SECTION      HYSTERECTOMY         No family history on file. Social History  Patient lives at home with  . At baseline patient ambulates with out help   Illicit drugs: Denies   TOBACCO:   reports that she has never smoked. She has never used smokeless tobacco.  ETOH:   reports no history of alcohol use. Home Medications  Prior to Admission medications    Medication Sig Start Date End Date Taking? Authorizing Provider   sertraline (ZOLOFT) 100 MG tablet Take 1 tablet by mouth daily 2/9/22 5/10/22  Jil Ramirez MD       Allergies  Allergies   Allergen Reactions    Penicillins        Review of Systems  Please see HPI above. All bolded are positive. All un-bolded are negative.   Constitutional Symptoms: fever, chills, fatigue, generalized weakness, diaphoresis, increase in thirst, loss of appetite  Eyes: vision change   Ears, Nose, Mouth, Throat: hearing loss, nasal congestion, sores in the mouth  Cardiovascular: chest pain, chest heaviness, palpitations  Respiratory: shortness of breath, wheezing, coughing  Gastrointestinal: abdominal pain, nausea, vomiting, diarrhea, constipation, melena, hematochezia, hematemesis  Genitourinary: dysuria, hematuria, increased frequency  Musculoskeletal: lower extremity edema, myalgias, arthralgias, back pain  Integumentary: rashes, itching   Neurological: headache, lightheadedness, dizziness, confusion, syncope, numbness, tingling, focal weakness  Psychiatric: depression, suicidal ideation, anxiety  Endocrine: unintentional weight change  Hematologic/Lymphatic: lymphadenopathy, easy bruising, easy bleeding   Allergic/Immunologic: recurrent infections      Objective  VITALS:  BP (!) 144/78   Pulse 70   Temp 98.2 °F (36.8 °C) (Oral)   Resp 18   Ht 5' 3\" (1.6 m)   Wt 239 lb 8 oz (108.6 kg)   SpO2 97%   BMI 42.43 kg/m²     Physical Exam:  General: awake, alert, oriented to person, place, time, and purpose, appears stated age, cooperative, no acute distress, pleasant, appropriate mood, morbidly obese   Eyes: conjunctivae/corneas clear, sclera non icteric, EOMI  Ears: no obvious scars, no lesions, no masses, hearing intact  Mouth: mucous membranes moist, no obvious oral sores  Head: normocephalic, atraumatic  Neck: no JVD, no adenopathy, no thyromegaly, neck is supple, trachea is midline  Back: ROM normal, no CVA tenderness.   Chest: no pain on palpation  Lungs: clear to auscultation bilaterally, without rhonchi, crackle, wheezing, or rale, no retractions or use of accessory muscles  Heart: regular rate and regular rhythm, no murmur, normal S1, S2  Abdomen: soft, non-tender; bowel sounds normal; no masses, no organomegaly  : Deferred   Extremities: no lower extremity edema, extremities atraumatic, no cyanosis, no clubbing, 2+ pedal pulses palpated  Skin: normal color, normal texture, normal turgor, no rashes, no lesions  Neurologic:5/5 muscle strength throughout, normal muscle tone throughout, face symmetric, hearing intact, tongue midline, speech appropriate without slurring, sensation to fine touch intact in upper and lower extremities    Labs-   Lab Results   Component Value Date    WBC 8.5 05/28/2022    HGB 12.8 05/28/2022    HCT 40.7 05/28/2022     05/28/2022     05/28/2022    K 4.0 05/28/2022     05/28/2022    CREATININE 0.6 05/28/2022    BUN 12 05/28/2022    CO2 25 05/28/2022    GLUCOSE 92 05/28/2022    ALT 23 05/28/2022    AST 18 05/28/2022     Lab Results   Component Value Date    CKTOTAL 124 11/01/2010    CKMB 3.0 11/01/2010    TROPONINI <0.01 11/01/2010       Last echocardiogram:      Recent Radiological Studies:  US GALLBLADDER RUQ   Final Result   Gallstones with no wall thickening or biliary ductal dilatation. Mild fatty   infiltration liver otherwise unremarkable right upper quadrant ultrasound. NM HEPATOBILIARY    (Results Pending)       Assessment  Active Hospital Problems    Diagnosis     Intractable pain [R52]      Priority: Medium       Patient Active Problem List    Diagnosis Date Noted    Intractable pain 05/28/2022     Priority: Medium       Plan  · Symptomatic cholelithiasis   · Pain and nausea have improved   · RUQ US stones no acute cholecystitis   · HIDA cannot be done until Tuesday   · If she does well over night she can potentially be DC tomorrow and follow up with surgery as an op for continued evaluation of her GB   · General surgery consultation   · PT/OT  · Home medications to be reconciled and confirmed prior to being ordered  · DVT prophylaxis   · Code Status Full   · Discharge plan: Tomorrow barring set backs     Callie Given MD  Internal medicine   5/28/2022  2:53 PM    I can be reached through [employer].     NOTE:  This report was transcribed using voice recognition software. Every effort was made to ensure accuracy; however, inadvertent computerized transcription errors may be present.

## 2022-05-28 NOTE — ED PROVIDER NOTES
HPI:  22, Time: 12:47 AM EDT        Lelon Spatz is a 56 y.o. female presenting to the ED for right flank/right upper quadrant pain, beginning 1 day ago. The complaint has been persistent, severe in severity, and worsened by nothing. Patient states she ate a Western Aisha dip roast beef sandwich prior to going to bed. States she has not had any fever/chills associated, no chest heaviness/pressure/tightness, no coughing, no shortness of breath, no change in bowel/bladder patterns. She rates her pain at 10/10 severity. She denies any radiation of pain to the back or to the right lower quadrant. No relieving factors are reported. No other complaints. Review of Systems:   A complete review of systems was performed and pertinent positives and negatives are stated within HPI, all other systems reviewed and are negative.    --------------------------------------------- PAST HISTORY ---------------------------------------------  Past Medical History:  has no past medical history on file. Past Surgical History:  has a past surgical history that includes Hysterectomy and  section. Social History:  reports that she has never smoked. She has never used smokeless tobacco. She reports that she does not drink alcohol and does not use drugs. Family History: family history is not on file. The patients home medications have been reviewed.     Allergies: Penicillins    -------------------------------------------------- RESULTS -------------------------------------------------  All laboratory and radiology results have been personally reviewed by myself   LABS:  Results for orders placed or performed during the hospital encounter of 22   Urinalysis   Result Value Ref Range    Color, UA Yellow Straw/Yellow    Clarity, UA Clear Clear    Glucose, Ur Negative Negative mg/dL    Bilirubin Urine Negative Negative    Ketones, Urine Negative Negative mg/dL    Specific Gravity, UA 1.020 1.005 - 1.030 Blood, Urine TRACE-INTACT Negative    pH, UA 6.0 5.0 - 9.0    Protein, UA Negative Negative mg/dL    Urobilinogen, Urine 0.2 <2.0 E.U./dL    Nitrite, Urine Negative Negative    Leukocyte Esterase, Urine SMALL (A) Negative   Lactic Acid   Result Value Ref Range    Lactic Acid 1.4 0.5 - 2.2 mmol/L   Comprehensive Metabolic Panel   Result Value Ref Range    Sodium 141 132 - 146 mmol/L    Potassium 3.8 3.5 - 5.0 mmol/L    Chloride 105 98 - 107 mmol/L    CO2 22 22 - 29 mmol/L    Anion Gap 14 7 - 16 mmol/L    Glucose 131 (H) 74 - 99 mg/dL    BUN 19 6 - 20 mg/dL    CREATININE 0.8 0.5 - 1.0 mg/dL    GFR Non-African American >60 >=60 mL/min/1.73    GFR African American >60     Calcium 9.8 8.6 - 10.2 mg/dL    Total Protein 7.6 6.4 - 8.3 g/dL    Albumin 4.5 3.5 - 5.2 g/dL    Total Bilirubin 0.3 0.0 - 1.2 mg/dL    Alkaline Phosphatase 100 35 - 104 U/L    ALT 26 0 - 32 U/L    AST 20 0 - 31 U/L   CBC with Auto Differential   Result Value Ref Range    WBC 8.0 4.5 - 11.5 E9/L    RBC 4.95 3.50 - 5.50 E12/L    Hemoglobin 13.6 11.5 - 15.5 g/dL    Hematocrit 41.2 34.0 - 48.0 %    MCV 83.2 80.0 - 99.9 fL    MCH 27.5 26.0 - 35.0 pg    MCHC 33.0 32.0 - 34.5 %    RDW 14.5 11.5 - 15.0 fL    Platelets 691 591 - 456 E9/L    MPV 10.6 7.0 - 12.0 fL    Neutrophils % 54.9 43.0 - 80.0 %    Immature Granulocytes % 0.3 0.0 - 5.0 %    Lymphocytes % 32.8 20.0 - 42.0 %    Monocytes % 7.2 2.0 - 12.0 %    Eosinophils % 4.3 0.0 - 6.0 %    Basophils % 0.5 0.0 - 2.0 %    Neutrophils Absolute 4.37 1.80 - 7.30 E9/L    Immature Granulocytes # 0.02 E9/L    Lymphocytes Absolute 2.61 1.50 - 4.00 E9/L    Monocytes Absolute 0.57 0.10 - 0.95 E9/L    Eosinophils Absolute 0.34 0.05 - 0.50 E9/L    Basophils Absolute 0.04 0.00 - 0.20 E9/L   Microscopic Urinalysis   Result Value Ref Range    WBC, UA 2-5 0 - 5 /HPF    RBC, UA 0-1 0 - 2 /HPF    Bacteria, UA RARE (A) None Seen /HPF       RADIOLOGY:  Interpreted by Radiologist.  CT ABDOMEN PELVIS W IV CONTRAST Additional Contrast? None   Final Result   No bowel obstruction, free air, obstructive uropathy or acute appendicitis. The gallbladder is borderline distended. No calcified gallstones or   pericholecystic fluid/inflammation is identified to suggest secondary   findings of acute cholecystitis. Correlation clinical findings is suggested,   and if warranted clinically, further evaluation may be considered with a   right upper quadrant ultrasound.             ------------------------- NURSING NOTES AND VITALS REVIEWED ---------------------------  The nursing notes within the ED encounter and vital signs as below have been reviewed. BP (!) 170/88   Pulse 92   Temp 98.6 °F (37 °C) (Oral)   Resp 14   Ht 5' 3\" (1.6 m)   Wt 225 lb (102.1 kg)   SpO2 98%   BMI 39.86 kg/m²   Oxygen Saturation Interpretation: Normal      ---------------------------------------------------PHYSICAL EXAM--------------------------------------      Constitutional/General: Alert and oriented x3, well appearing, non toxic in moderate distress no JVD  Head: Normocephalic and atraumatic  Eyes: PERRL, EOMI  Mouth: Oropharynx clear, handling secretions, no trismus  Neck: Supple, full ROM, no JVD. Trachea midline  Pulmonary: Lungs clear to auscultation bilaterally, no wheezes, rales, or rhonchi. Not in respiratory distress  Cardiovascular:  Regular rate and rhythm, no murmurs, gallops, or rubs. 2+ distal pulses  Abdomen: Soft, non distended with moderate tenderness in the right upper quadrant. Negative West sign. No organomegaly no masses. Extremities: Moves all extremities x 4. Warm and well perfused  Skin: warm and dry without rash  Neurologic: GCS 15, cranial nerves grossly intact with no focal deficits.   No meningeal signs  Psych: Normal Affect      ------------------------------ ED COURSE/MEDICAL DECISION MAKING----------------------  Medications   0.9 % sodium chloride bolus (0 mLs IntraVENous Stopped 5/28/22 0200)   ketorolac (TORADOL) injection 30 mg (30 mg IntraVENous Given 5/28/22 0106)   ondansetron (ZOFRAN) injection 4 mg (4 mg IntraVENous Given 5/28/22 0105)   diphenhydrAMINE (BENADRYL) injection 50 mg (50 mg IntraVENous Given 5/28/22 0220)   metoclopramide (REGLAN) injection 5 mg (5 mg IntraVENous Given 5/28/22 0220)   LORazepam (ATIVAN) injection 1 mg (1 mg IntraVENous Given 5/28/22 0220)   ondansetron (ZOFRAN) injection 4 mg (4 mg IntraVENous Given 5/28/22 0258)   iopamidol (ISOVUE-370) 76 % injection 75 mL (75 mLs IntraVENous Given 5/28/22 0250)   prochlorperazine (COMPAZINE) injection 5 mg (5 mg IntraVENous Given 5/28/22 0308)   LORazepam (ATIVAN) injection 0.5 mg (0.5 mg IntraVENous Given 5/28/22 0401)   metoclopramide (REGLAN) injection 5 mg (5 mg IntraVENous Given 5/28/22 0447)       ED COURSE:     Medical Decision Making:   Differential diagnoses:  Ureteral calculus, cholecystitis, pancreatitis, atypical appendicitis, IBS, mesenteric adenitis, to name a few. Patient has had multiple episodes of vomiting which has been intractable to multiple different antiemetic meds administered. Patient's CT abdominal study shows distended gallbladder but no definite cholelithiasis nor any definite findings to suggest cholecystitis. Patient states her pain is abated but she is unable to tolerate p.o. fluids. Patient is felt to be appropriate for admission due to intractable nausea and vomiting. Consult was placed to Dr. Renetta Perea, who covers for 's admissions and accepted the patient transfer to [address] [place] [address]. was selected by the patient is her case preference for admission. Counseling: The emergency provider has spoken with the patient and discussed todays results, in addition to providing specific details for the plan of care and counseling regarding the diagnosis and prognosis.   Questions are answered at this time and they are agreeable with the plan.    --------------------------------- IMPRESSION AND DISPOSITION ---------------------------------    IMPRESSION  1. Intractable nausea and vomiting    2. Abdominal pain, right upper quadrant        DISPOSITION  Disposition: Transfer to [address] [place] [address] Surg  Patient condition is stable      NOTE: This report was transcribed using voice recognition software.  Every effort was made to ensure accuracy; however, inadvertent computerized transcription errors may be present        Candy Tom MD  05/28/22 Calvin Chin MD  05/28/22 3640

## 2022-05-28 NOTE — PLAN OF CARE
Problem: Discharge Planning  Goal: Discharge to home or other facility with appropriate resources  Outcome: Progressing  Flowsheets (Taken 5/28/2022 0928)  Discharge to home or other facility with appropriate resources: Identify barriers to discharge with patient and caregiver

## 2022-05-29 VITALS
HEIGHT: 63 IN | HEART RATE: 64 BPM | OXYGEN SATURATION: 97 % | DIASTOLIC BLOOD PRESSURE: 71 MMHG | TEMPERATURE: 97.8 F | WEIGHT: 239.86 LBS | SYSTOLIC BLOOD PRESSURE: 141 MMHG | RESPIRATION RATE: 18 BRPM | BODY MASS INDEX: 42.5 KG/M2

## 2022-05-29 LAB
ALBUMIN SERPL-MCNC: 3.7 G/DL (ref 3.5–5.2)
ALP BLD-CCNC: 92 U/L (ref 35–104)
ALT SERPL-CCNC: 18 U/L (ref 0–32)
ANION GAP SERPL CALCULATED.3IONS-SCNC: 9 MMOL/L (ref 7–16)
AST SERPL-CCNC: 18 U/L (ref 0–31)
BASOPHILS ABSOLUTE: 0.03 E9/L (ref 0–0.2)
BASOPHILS RELATIVE PERCENT: 0.7 % (ref 0–2)
BILIRUB SERPL-MCNC: 0.7 MG/DL (ref 0–1.2)
BUN BLDV-MCNC: 13 MG/DL (ref 6–20)
CALCIUM SERPL-MCNC: 8.4 MG/DL (ref 8.6–10.2)
CHLORIDE BLD-SCNC: 106 MMOL/L (ref 98–107)
CO2: 25 MMOL/L (ref 22–29)
CREAT SERPL-MCNC: 0.7 MG/DL (ref 0.5–1)
EOSINOPHILS ABSOLUTE: 0.24 E9/L (ref 0.05–0.5)
EOSINOPHILS RELATIVE PERCENT: 5.4 % (ref 0–6)
GFR AFRICAN AMERICAN: >60
GFR NON-AFRICAN AMERICAN: >60 ML/MIN/1.73
GLUCOSE BLD-MCNC: 113 MG/DL (ref 74–99)
HCT VFR BLD CALC: 38 % (ref 34–48)
HEMOGLOBIN: 12 G/DL (ref 11.5–15.5)
IMMATURE GRANULOCYTES #: 0.02 E9/L
IMMATURE GRANULOCYTES %: 0.4 % (ref 0–5)
LYMPHOCYTES ABSOLUTE: 1.53 E9/L (ref 1.5–4)
LYMPHOCYTES RELATIVE PERCENT: 34.4 % (ref 20–42)
MCH RBC QN AUTO: 27.3 PG (ref 26–35)
MCHC RBC AUTO-ENTMCNC: 31.6 % (ref 32–34.5)
MCV RBC AUTO: 86.4 FL (ref 80–99.9)
MONOCYTES ABSOLUTE: 0.35 E9/L (ref 0.1–0.95)
MONOCYTES RELATIVE PERCENT: 7.9 % (ref 2–12)
NEUTROPHILS ABSOLUTE: 2.28 E9/L (ref 1.8–7.3)
NEUTROPHILS RELATIVE PERCENT: 51.2 % (ref 43–80)
PDW BLD-RTO: 14.6 FL (ref 11.5–15)
PLATELET # BLD: 262 E9/L (ref 130–450)
PMV BLD AUTO: 10.9 FL (ref 7–12)
POTASSIUM REFLEX MAGNESIUM: 4.1 MMOL/L (ref 3.5–5)
RBC # BLD: 4.4 E12/L (ref 3.5–5.5)
SODIUM BLD-SCNC: 140 MMOL/L (ref 132–146)
TOTAL PROTEIN: 6.2 G/DL (ref 6.4–8.3)
WBC # BLD: 4.5 E9/L (ref 4.5–11.5)

## 2022-05-29 PROCEDURE — 6370000000 HC RX 637 (ALT 250 FOR IP): Performed by: STUDENT IN AN ORGANIZED HEALTH CARE EDUCATION/TRAINING PROGRAM

## 2022-05-29 PROCEDURE — G0378 HOSPITAL OBSERVATION PER HR: HCPCS

## 2022-05-29 PROCEDURE — 80053 COMPREHEN METABOLIC PANEL: CPT

## 2022-05-29 PROCEDURE — 85025 COMPLETE CBC W/AUTO DIFF WBC: CPT

## 2022-05-29 PROCEDURE — 2580000003 HC RX 258: Performed by: STUDENT IN AN ORGANIZED HEALTH CARE EDUCATION/TRAINING PROGRAM

## 2022-05-29 PROCEDURE — 36415 COLL VENOUS BLD VENIPUNCTURE: CPT

## 2022-05-29 RX ADMIN — Medication 10 ML: at 09:18

## 2022-05-29 RX ADMIN — SODIUM CHLORIDE: 9 INJECTION, SOLUTION INTRAVENOUS at 08:18

## 2022-05-29 RX ADMIN — ACETAMINOPHEN 650 MG: 325 TABLET ORAL at 08:12

## 2022-05-29 ASSESSMENT — PAIN SCALES - GENERAL
PAINLEVEL_OUTOF10: 1
PAINLEVEL_OUTOF10: 3

## 2022-05-29 ASSESSMENT — PAIN DESCRIPTION - ORIENTATION: ORIENTATION: RIGHT

## 2022-05-29 ASSESSMENT — PAIN DESCRIPTION - LOCATION: LOCATION: HEAD

## 2022-05-29 ASSESSMENT — PAIN DESCRIPTION - DESCRIPTORS: DESCRIPTORS: ACHING

## 2022-05-29 NOTE — PROGRESS NOTES
GENERAL SURGERY  DAILY PROGRESS NOTE  5/29/2022    No chief complaint on file. Subjective:  naeo  States her abd pain is resolved  Denies n/v/f/c  vss/af    Objective:  BP (!) 141/71   Pulse 65   Temp 97.8 °F (36.6 °C) (Oral)   Resp 18   Ht 5' 3\" (1.6 m)   Wt 239 lb 13.8 oz (108.8 kg)   SpO2 97%   BMI 42.49 kg/m²     Gen: aox3, nad  Heent: nc/at  Neck: trachea midline, no LAD  Cvs: rrr  Lungs: non labored  Abd: soft, nt/nd, no r/g/r  Neuro: no focal deficits      Assessment/Plan:  56 y.o. female symptomatic cholelithiasis    Ok for dc from surgery POV  F/u with me this week to discuss elective cholecystectomy  Pt instructed to call my office or return to ER if symptoms return   Discussed with Dr. Audrey White MD  Minimally Invasive General Surgery and Endoscopy  [address] [facility].  [address]: [phone]  F: [phone]      Electronically signed by Roxana Bañuelos MD on 5/29/2022 at 10:15 AM

## 2022-05-29 NOTE — PROGRESS NOTES
Internal Medicine Progress Note    Patient's name: Keri Singletary  : 1965  Chief complaints (on day of admission): No chief complaint on file. Admission date: 2022  Date of service: 2022   Room: [address] MED SURG/TELE  Primary care physician: Tish Lynne MD  Reason for visit: Follow-up for abdominal pain    Subjective  Ines was seen and examined at bedside     No family at bedside today   Doing very well   Has not had any more abdominal pain   Discussed need for op fu with Dr Minda Teresa to discuss elective cholecystectomy   All questions answered   OK for DC today   Discussed with Dr Minda Teresa     Current treatment plan discussed and all questions answered    Current medications being prescribed discussed and patient expresses verbal understanding     Review of Systems=  There are no new complaints of chest pain, shortness of breath, abdominal pain, nausea, vomiting, diarrhea, constipation unless otherwise mentioned above.      Hospital Medications  Current Facility-Administered Medications   Medication Dose Route Frequency Provider Last Rate Last Admin    sodium chloride flush 0.9 % injection 10 mL  10 mL IntraVENous 2 times per day Mona Araiaz MD   10 mL at 22    sodium chloride flush 0.9 % injection 10 mL  10 mL IntraVENous PRN Mona Araiza MD        0.9 % sodium chloride infusion   IntraVENous PRN Mona Araiza MD        potassium chloride (KLOR-CON M) extended release tablet 40 mEq  40 mEq Oral PRN Mona Araiza MD        Or    potassium bicarb-citric acid (EFFER-K) effervescent tablet 40 mEq  40 mEq Oral PRN Mona Araiza MD        Or    potassium chloride 10 mEq/100 mL IVPB (Peripheral Line)  10 mEq IntraVENous PRN Mona Araiza MD        enoxaparin Sodium (LOVENOX) injection 30 mg  30 mg SubCUTAneous BID Mona Araiza MD   30 mg at 22    ondansetron (ZOFRAN-ODT) disintegrating tablet 4 mg  4 mg Oral Q8H PRN Mona Araiza MD        Or    ondansetron [facility]) injection 4 mg  4 mg IntraVENous Q6H PRN Harvey Carlson MD        [facility]) tablet 8.6 mg  1 tablet Oral Daily PRN Harvey Carlson MD        acetaminophen (TYLENOL) tablet 650 mg  650 mg Oral Q6H PRN Harvey Carlson MD   650 mg at 05/28/22 2100    Or    acetaminophen (TYLENOL) suppository 650 mg  650 mg Rectal Q6H PRN Harvey Carlson MD        morphine (PF) injection 2 mg  2 mg IntraVENous Q4H PRN Harvey Carlson MD        prochlorperazine (COMPAZINE) injection 10 mg  10 mg IntraVENous Q6H PRN Harvey Carlson MD        promethazine [facility]) injection 6.25 mg  6.25 mg IntraMUSCular Q6H PRN Harvey Carlson MD        0.9 % sodium chloride infusion   IntraVENous Continuous Harvey Carlson  mL/hr at 05/28/22 2056 New Bag at 05/28/22 2056    melatonin tablet 3 mg  3 mg Oral Nightly PRN Harvey Carlson MD           PRN Medications  sodium chloride flush, sodium chloride, potassium chloride **OR** potassium alternative oral replacement **OR** potassium chloride, ondansetron **OR** ondansetron, senna, acetaminophen **OR** acetaminophen, morphine, prochlorperazine, promethazine, melatonin    Objective  Most Recent Recorded Vitals  BP (!) 144/62   Pulse 65   Temp 98.4 °F (36.9 °C) (Oral)   Resp 16   Ht 5' 3\" (1.6 m)   Wt 239 lb 13.8 oz (108.8 kg)   SpO2 97%   BMI 42.49 kg/m²   No intake/output data recorded. No intake/output data recorded.     Physical Exam:  General: AAO to person/place/time/purpose, NAD, no labored breathing  Eyes: conjunctivae/corneas clear, sclera non icteric  Skin: color/texture/turgor normal, no rashes or lesions  Lungs: CTAB, no retractions/use of accessory muscles, no vocal fremitus, no rhonchi, no crackle, no rales  Heart: regular rate, regular rhythm, no murmur  Abdomen: soft, NT, bowel sounds normal  Extremities: atraumatic, no edema  Neurologic: cranial nerves 2-12 grossly intact, no slurred speech    Most Recent Labs  Lab Results   Component Value Date    WBC 4.5 05/29/2022 HGB 12.0 05/29/2022    HCT 38.0 05/29/2022     05/29/2022     05/29/2022    K 4.1 05/29/2022     05/29/2022    CREATININE 0.7 05/29/2022    BUN 13 05/29/2022    CO2 25 05/29/2022    GLUCOSE 113 (H) 05/29/2022    ALT 18 05/29/2022    AST 18 05/29/2022    TSH 1.267 10/06/2010    LABA1C 5.8 (H) 05/28/2022       US GALLBLADDER RUQ   Final Result   Gallstones with no wall thickening or biliary ductal dilatation. Mild fatty   infiltration liver otherwise unremarkable right upper quadrant ultrasound. [facility]    (Results Pending)       Echocardiogram       Assessment   Active Hospital Problems    Diagnosis     Intractable pain [R52]      Priority: Medium         Plan  · Symptomatic cholelithiasis   ? Pain and nausea have improved   ? RUQ US stones no acute cholecystitis   ? HIDA cannot be done until Tuesday   ? DC today with op fu with surgery  ? General surgery consultation noted - discussed with them today  · PT/OT  · Home medications to be reconciled and confirmed prior to being ordered  · DVT prophylaxis   · Code Status Full  · Medications, labs and imaging reviewed   · Discharge plan: DC today    Electronically signed by Heena Henley MD on 5/29/2022 at 8:08 AM    I can be reached through [facility].

## 2022-05-29 NOTE — PLAN OF CARE
Problem: Discharge Planning  Goal: Discharge to home or other facility with appropriate resources  5/28/2022 2318 by Ivy Lubin RN  Outcome: Progressing     Problem: Safety - Adult  Goal: Free from fall injury  5/28/2022 2318 by Ivy Lubin RN  Outcome: Progressing     Problem: ABCDS Injury Assessment  Goal: Absence of physical injury  5/28/2022 2318 by Ivy Lubin RN  Outcome: Progressing

## 2022-05-29 NOTE — PLAN OF CARE
Problem: Discharge Planning  Goal: Discharge to home or other facility with appropriate resources  5/29/2022 1111 by Zackery North RN  Outcome: Progressing  Flowsheets (Taken 5/29/2022 0800)  Discharge to home or other facility with appropriate resources:   Identify barriers to discharge with patient and caregiver   Arrange for needed discharge resources and transportation as appropriate  5/28/2022 2318 by Froilan Allen, RN  Outcome: Progressing

## 2022-05-29 NOTE — DISCHARGE SUMMARY
Internal Medicine Discharge Summary    NAME: Shubham Hernandez :  1965  MRN:  [id number] John Rueda MD    ADMITTED: 2022   DISCHARGED: 2022  1:34 PM    ADMITTING PHYSICIAN: No att. providers found    PCP: Helen Mejía MD    CONSULTANT(S):   IP CONSULT TO SOCIAL WORK  IP CONSULT TO GENERAL SURGERY     ADMITTING DIAGNOSIS:   Intractable pain [R52]     Please see H&P for further details    DISCHARGE DIAGNOSES:   Active Hospital Problems    Diagnosis     Intractable pain [R52]      Priority: Medium       BRIEF HISTORY OF PRESENT ILLNESS: Shubham Hernandez is a 56 y.o. female patient of Helen Mejía MD who  has no past medical history on file. who originally had no chief complaint listed for this encounter. at presentation on 2022, and was found to have Intractable pain [R52] after workup. Please see H&P for further details. HOSPITAL COURSE:   The patient presented to the hospital with the chief complaint of No chief complaint on file. . The patient was admitted to the hospital.     · Symptomatic cholelithiasis   ? Pain and nausea have improved   ? RUQ US stones no acute cholecystitis   ? HIDA cannot be done until Tuesday   ? DC today with op fu with surgery  ? General surgery consultation noted - discussed with them today  · PT/OT  · Home medications to be reconciled and confirmed prior to being ordered  · DVT prophylaxis   · Code Status Full  · Medications, labs and imaging reviewed   Discharge plan: DC today      BRIEF PHYSICAL EXAMINATION AND LABORATORIES ON DAY OF DISCHARGE:  VITALS:  BP (!) 141/71   Pulse 64   Temp 97.8 °F (36.6 °C) (Oral)   Resp 18   Ht 5' 3\" (1.6 m)   Wt 239 lb 13.8 oz (108.8 kg)   SpO2 97%   BMI 42.49 kg/m²       Please see note from the same day.      LABS[de-identified]  Recent Labs     22  0100 22  1130 22  0203    142 140   K 3.8 4.0 4.1    105 106   CO2 22 25 25   BUN 19 12 13   CREATININE 0.8 0.6 0.7   GLUCOSE 131* 92 113* CALCIUM 9.8 9.1 8.4*     Recent Labs     05/28/22  0100 05/28/22  1130 05/29/22  0203   ALKPHOS 100 93 92   ALT 26 23 18   AST 20 18 18   PROT 7.6 7.4 6.2*   BILITOT 0.3 0.5 0.7   LABALBU 4.5 4.3 3.7     Recent Labs     05/28/22  0100 05/28/22  1130 05/29/22  0203   WBC 8.0 8.5 4.5   RBC 4.95 4.75 4.40   HGB 13.6 12.8 12.0   HCT 41.2 40.7 38.0   MCV 83.2 85.7 86.4   MCH 27.5 26.9 27.3   MCHC 33.0 31.4* 31.6*   RDW 14.5 14.5 14.6    312 262   MPV 10.6 10.8 10.9     Lab Results   Component Value Date    LABA1C 5.8 (H) 05/28/2022     No results found for: INR, PROTIME   Lab Results   Component Value Date    TSH 1.267 10/06/2010     Lab Results   Component Value Date    TRIG 175 (H) 05/28/2022    TRIG 234 (H) 10/06/2010    HDL 42 05/28/2022    HDL 43.0 10/06/2010    LDLCALC 96 05/28/2022    LDLCALC 93 10/06/2010     No results for input(s): MG in the last 72 hours. No results for input(s): CKTOTAL, CKMB, TROPONINI in the last 72 hours. Recent Labs     05/28/22  0205   LACTA 1.4       IMAGING:  CT ABDOMEN PELVIS W IV CONTRAST Additional Contrast? None    Result Date: 5/28/2022  EXAMINATION: CT OF THE ABDOMEN AND PELVIS WITH CONTRAST 5/28/2022 2:41 am TECHNIQUE: CT of the abdomen and pelvis was performed with the administration of intravenous contrast. Multiplanar reformatted images are provided for review. Automated exposure control, iterative reconstruction, and/or weight based adjustment of the mA/kV was utilized to reduce the radiation dose to as low as reasonably achievable. COMPARISON: None. HISTORY: ORDERING SYSTEM PROVIDED HISTORY: R Flank and RLQ pain TECHNOLOGIST PROVIDED HISTORY: Additional Contrast?->None Reason for exam:->R Flank and RLQ pain Decision Support Exception - unselect if not a suspected or confirmed emergency medical condition->Emergency Medical Condition (MA) FINDINGS: Lower Chest: The lung bases are clear. There is no pleural or pericardial effusion. Organs:  The liver, spleen, kidneys, adrenal glands and pancreas are normal. The gallbladder is at the upper limits of normal with respect to size. However, no calcified gallstones or pericholecystic fluid/inflammation is identified. GI/Bowel: The small and large bowel, including the appendix, are normal.  No bowel obstruction, free air or acute appendicitis. Pelvis: The pelvic organs are normal.  No free fluid or lymphadenopathy. Peritoneum/Retroperitoneum: No mass, fluid collection or lymphadenopathy is seen. Bones/Soft Tissues: No osseous, soft tissue or vascular abnormality is seen. No bowel obstruction, free air, obstructive uropathy or acute appendicitis. The gallbladder is borderline distended. No calcified gallstones or pericholecystic fluid/inflammation is identified to suggest secondary findings of acute cholecystitis. Correlation clinical findings is suggested, and if warranted clinically, further evaluation may be considered with a right upper quadrant ultrasound. US GALLBLADDER RUQ    Result Date: 5/28/2022  EXAMINATION: RIGHT UPPER QUADRANT ULTRASOUND 5/28/2022 10:16 am COMPARISON: None. HISTORY: ORDERING SYSTEM PROVIDED HISTORY: gb distention TECHNOLOGIST PROVIDED HISTORY: Reason for exam:->gb distention What reading provider will be dictating this exam?->CRC FINDINGS: LIVER:  The liver demonstrates increased echogenicity without evidence of intrahepatic biliary ductal dilatation. BILIARY SYSTEM:  Gallbladder reveals multiple stones. There is no wall thickening or biliary ductal dilatation. No pericholecystic fluid. Negative sonographic West sign. Common bile duct is within normal limits measuring 4 mm. RIGHT KIDNEY: The right kidney is grossly unremarkable without evidence of hydronephrosis. The right kidney measures in length from pole to pole 12.1 cm. No solid cortical mass or renal cortical cyst seen within the right kidney. No hydronephrosis or nephrolithiasis.  PANCREAS:  Visualized portions of the pancreas are unremarkable. OTHER: No evidence of right upper quadrant ascites. Gallstones with no wall thickening or biliary ductal dilatation. Mild fatty infiltration liver otherwise unremarkable right upper quadrant ultrasound. MICROBIOLOGY:  BLOOD CX #1  No results for input(s): BC in the last 72 hours. BLOOD CX #2  No results for input(s): Gevena Benson in the last 72 hours. TIP CULTURE  No results for input(s): CXCATHTIP in the last 72 hours. CULTURE, RESPIRATORY   No results for input(s): CULTRESP in the last 72 hours. RESPIRATORY SMEAR  No results for input(s): RESPSMEAR in the last 72 hours. ECHO:      DISPOSITION:  The patient's condition is good. The patient is being discharged to home    DISCHARGE MEDICATIONS:      Medication List      CONTINUE taking these medications    sertraline 100 MG tablet  Commonly known as: ZOLOFT  Take 1 tablet by mouth daily            Discharge Medication List as of 5/29/2022 11:52 AM        Discharge Medication List as of 5/29/2022 11:52 AM      STOP taking these medications       VAGIFEM 10 MCG TABS vaginal tablet Comments:   Reason for Stopping:         VAGIFEM 10 MCG TABS vaginal tablet Comments:   Reason for Stopping:             Discharge Medication List as of 5/29/2022 11:52 AM          INTERNAL MEDICINE FOLLOW UP/INSTRUCTIONS:  · Follow-up with primary care physician within 1 week of discharge from hospital  · Please review changes to pre-hospital admission medications and prescriptions for new medications upon discharge from the hospital with PCP  · Please review results of labs and imaging studies with PCP  · Follow-up with consultants as directed by them   · If recurrence or worsening of symptoms please call primary care physician or return to the ER immediately  · Diet: ADULT DIET; Clear Liquid    Preparing for this patient's discharge, including paperwork, orders, instructions, and meeting with patient did required >35 minutes.     Electronically signed by Janes Jones MD on 5/29/2022 at 3:11 PM

## 2022-06-01 LAB
EKG ATRIAL RATE: 67 BPM
EKG P AXIS: 35 DEGREES
EKG P-R INTERVAL: 156 MS
EKG Q-T INTERVAL: 422 MS
EKG QRS DURATION: 94 MS
EKG QTC CALCULATION (BAZETT): 445 MS
EKG R AXIS: 12 DEGREES
EKG T AXIS: 20 DEGREES
EKG VENTRICULAR RATE: 67 BPM

## 2022-06-23 ENCOUNTER — HOSPITAL ENCOUNTER (OUTPATIENT)
Age: 57
Discharge: HOME | End: 2022-06-25

## 2022-06-23 PROCEDURE — 88304 TISSUE EXAM BY PATHOLOGIST: CPT

## 2024-08-12 ENCOUNTER — HOSPITAL ENCOUNTER (OUTPATIENT)
Dept: RADIOLOGY | Facility: EXTERNAL LOCATION | Age: 59
Discharge: HOME | End: 2024-08-12

## 2024-08-12 ENCOUNTER — APPOINTMENT (OUTPATIENT)
Dept: OBSTETRICS AND GYNECOLOGY | Facility: CLINIC | Age: 59
End: 2024-08-12
Payer: COMMERCIAL

## 2024-08-12 VITALS
BODY MASS INDEX: 40.93 KG/M2 | HEIGHT: 63 IN | DIASTOLIC BLOOD PRESSURE: 74 MMHG | WEIGHT: 231 LBS | SYSTOLIC BLOOD PRESSURE: 124 MMHG

## 2024-08-12 DIAGNOSIS — Z01.419 WELL WOMAN EXAM WITH ROUTINE GYNECOLOGICAL EXAM: Primary | ICD-10-CM

## 2024-08-12 DIAGNOSIS — Z12.31 SCREENING MAMMOGRAM FOR BREAST CANCER: ICD-10-CM

## 2024-08-12 DIAGNOSIS — Z11.51 SCREENING FOR HUMAN PAPILLOMAVIRUS (HPV): ICD-10-CM

## 2024-08-12 DIAGNOSIS — Z90.711 S/P LAPAROSCOPIC SUPRACERVICAL HYSTERECTOMY: ICD-10-CM

## 2024-08-12 DIAGNOSIS — Z12.4 SCREENING FOR CERVICAL CANCER: ICD-10-CM

## 2024-08-12 DIAGNOSIS — N95.2 ATROPHIC VAGINITIS: ICD-10-CM

## 2024-08-12 PROCEDURE — 1036F TOBACCO NON-USER: CPT | Performed by: NURSE PRACTITIONER

## 2024-08-12 PROCEDURE — 88175 CYTOPATH C/V AUTO FLUID REDO: CPT

## 2024-08-12 PROCEDURE — 99396 PREV VISIT EST AGE 40-64: CPT | Performed by: NURSE PRACTITIONER

## 2024-08-12 PROCEDURE — 87624 HPV HI-RISK TYP POOLED RSLT: CPT

## 2024-08-12 PROCEDURE — 3008F BODY MASS INDEX DOCD: CPT | Performed by: NURSE PRACTITIONER

## 2024-08-12 RX ORDER — CLINDAMYCIN HYDROCHLORIDE 150 MG/1
CAPSULE ORAL
COMMUNITY
Start: 2024-08-04

## 2024-08-12 RX ORDER — ESTRADIOL 10 UG/1
10 INSERT VAGINAL 2 TIMES WEEKLY
Qty: 18 TABLET | Refills: 11 | Status: SHIPPED | OUTPATIENT
Start: 2024-08-12

## 2024-08-12 RX ORDER — SERTRALINE HYDROCHLORIDE 100 MG/1
100 TABLET, FILM COATED ORAL DAILY
COMMUNITY

## 2024-08-12 RX ORDER — ESTRADIOL 0.1 MG/G
CREAM VAGINAL
COMMUNITY
Start: 2021-12-15

## 2024-08-12 RX ORDER — ASCORBIC ACID 500 MG
CAPSULE ORAL
COMMUNITY
Start: 2024-08-05

## 2024-08-12 RX ORDER — AMMONIUM LACTATE 12 G/100G
CREAM TOPICAL
COMMUNITY
Start: 2024-06-25

## 2024-08-12 NOTE — PROGRESS NOTES
"    Jose Haddad is a 59 y.o. female who is here for a routine exam. Periods are  absent, s/p LASH .    History of abnormal Pap smear: no  Family history of uterine or ovarian cancer: no  Regular self breast exam: no    History of abnormal mammogram: no  Family history of breast cancer: no    Last pap: obtained today    Review of Systems:    Constitutional: Negative.    HENT: Negative.     Eyes: Negative.    Respiratory: Negative.     Cardiovascular: Negative.    Gastrointestinal: Negative.    Endocrine: Negative.    Genitourinary: Negative.    Musculoskeletal: Negative.    Skin: Negative.    Allergic/Immunologic: Negative.    Neurological: Negative.    Hematological: Negative.    Psychiatric/Behavioral: Negative.       Past Medical History:   Diagnosis Date    Anxiety disorder, unspecified     Anxiety and depression      Past Surgical History:   Procedure Laterality Date    ADENOIDECTOMY       SECTION, LOW TRANSVERSE      CHOLECYSTECTOMY      HYSTERECTOMY      WISDOM TOOTH EXTRACTION        Menstrual History:  OB History          2    Para   2    Term                AB        Living   2         SAB        IAB        Ectopic        Multiple        Live Births   2               No LMP recorded (exact date). Patient has had a hysterectomy.         Review of Systems    Objective   /74   Ht 1.6 m (5' 3\")   Wt 105 kg (231 lb)   LMP  (Exact Date)   BMI 40.92 kg/m²     Exam:   Constitutional; alert with no acute distress.  Well-nourished.      Neck: No asymmetry noted.  Thyroid without enlargement.  No palpable nodules or masses of concern.    Cardiovascular: Heart regular rate and rhythm, normal S1 and S2    Pulmonary: No respiratory distress, lungs clear to auscultate bilaterally    Chest/breast exam: Appearance bilaterally normal, without asymmetry of concern, without skin lesions or nipple discharge.  Palpation of the breast-no palpable masses no lymphadenopathy of the " axilla.    Abdomen: Soft, nontender, no abdominal masses palpated    Genitourinary:  Palpation of the lymph nodes of the groin-no inguinal lymphadenopathy  External genitalia/perianal: Without lesions normal in appearance   Urethra: In appearance without lesions Bladder: non-tender to palpate  Vagina: Without lesions including Bartholin, urethra, Ingram's glands within normal limits all WNL   Cervix: Normal in appearance without lesions, no cervical motion tenderness to palpation  Uterus: surgically absent   Bilateral adnexa:  without masses, nontender to palpate    Skin: Normal skin color and pigmentation    Psychiatric: Alert and oriented x3. Affect normal to patient baseline.  Mood: appropriate       Assessment/Plan   Diagnoses and all orders for this visit:  Well woman exam with routine gynecological exam  Screening mammogram for breast cancer  -     BI mammo bilateral screening tomosynthesis; Future  Screening for human papillomavirus (HPV)  -     THINPREP PAP TEST  Screening for cervical cancer  -     THINPREP PAP TEST  S/P laparoscopic supracervical hysterectomy  Atrophic vaginitis  -     estradiol (Vagifem) 10 mcg tablet vaginal tablet; Insert 1 tablet (10 mcg) into the vagina 2 times a week. Insert 1 tablet into vagina at bedtime for 2 weeks, then at bedtime twice a week.      No follow-ups on file.     Pap plan: WNL HX, plan co-testing every 5 years   Safe sex discussed   RTO 1 year annual exam, prn   Mammogram screening evaluation     If no improvement with vaginal estrogen consider PT, pelvic floor.     Yamileth Ahuja, APRN-CNM, APRN-CNP

## 2025-02-27 DIAGNOSIS — N95.2 ATROPHIC VAGINITIS: ICD-10-CM

## 2025-03-01 RX ORDER — ESTRADIOL 10 UG/1
10 TABLET, FILM COATED VAGINAL 2 TIMES WEEKLY
Qty: 24 TABLET | Refills: 1 | Status: SHIPPED | OUTPATIENT
Start: 2025-03-03